# Patient Record
(demographics unavailable — no encounter records)

---

## 2025-02-09 NOTE — HISTORY OF PRESENT ILLNESS
[Patient] : patient [FreeTextEntry1] : unsteady gait, hx of Left breast cancer [FreeTextEntry2] : PMH chronic low back pain, former smoker, CAD s/p CABG x5 2017 and PCI 2021,HTN, DM, GI bleed with melena & BRBPR at different times, fall with head trauma 3/14/2022 admitted to [facility]; HER2+ Left breast cancer in December 2021 w/ mets to L axilla s/p chemo (last 8/2022), s/p left modified radical mastectomy with left breast oncoplastic flap closure with ATT on 1/13/23.   Interval events:  -A&Ox3 reports he is doing well -Went to [facility] for [place] -Saw Oncologist, all stable. Immunotherapy finished 1/16/24 for breast cancer- was started on Tamoxifen 20mg daily for 1 yr.  - Has appt for port removal tomorrow  Subjective: 1. Appetite/Weight: Good, no concerns 2. Gait/Falls: no recent falls, walks independently 3. Sleep: Good but wakes1-2 to urinate. 4. BMs: Some constipation.  5. Urine: Nocturia 6. Skin: No rash or ulcers. Healed scars on Left chest and abd s/p left breast surgery. 7. Mood/Memory: Pleasant  Breast cancer in male: -Pt doing well s/p left modified radical mastectomy with left breast oncoplastic flap     closure with ATT on 1/13/23 -Pt states immunotherapy finished 1/16/24 for breast cancer- was started on Tamoxifen 20mg daily for 1 yr.  - continues to follow with plastic Surgeon and oncology  CAD     - rosuvastatin 40 mg daily     - Toprol  mg daily     - Losartan 25mg daily     -Follow up with Cardiology  DM2: Chronic, controlled     -Checks glucose daily, FS ranging  for the last 2-3yrs     -Cholesterol & A1C followed by Oncologist     -Continue Jardiance 10mg daily and rosuvastatin 40mg daily  HTN: controlled    -continue metoprolol ER 100mg daily and losartan Potassium 25mg Daily    -continue to f/u with Cardiology    DME: Uses cane occasionally to ambulate. Has walker available.  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 2019 -Goal to return to [place] to see his family.

## 2025-02-09 NOTE — ASSESSMENT
[FreeTextEntry1] : Trial of miralax Due to insurance will be transitioning to new PCP. referred to [facility] for furhter assistance.

## 2025-02-09 NOTE — HEALTH RISK ASSESSMENT
[HRA Reviewed] : Health risk assessment reviewed [Independent] : managing finances [Some assistance needed] : using transportation [Full assistance needed] : doing laundry [No falls in past year] : Patient reported no falls in the past year [Yes] : The patient does have visual impairment [TimeGetUpGo] : 22

## 2025-02-09 NOTE — PHYSICAL EXAM
[No Acute Distress] : no acute distress [Normal Sclera/Conjunctiva] : normal sclera/conjunctiva [EOMI] : extra ocular movement intact [Normal Outer Ear/Nose] : the ears and nose were normal in appearance [Normal Oropharynx] : the oropharynx was normal [No Respiratory Distress] : no respiratory distress [Clear to Auscultation] : lungs were clear to auscultation bilaterally [No Accessory Muscle Use] : no accessory muscle use [Normal Rate] : heart rate was normal  [Regular Rhythm] : with a regular rhythm [Normal S1, S2] : normal S1 and S2 [No Murmurs] : no murmurs heard [No Edema] : there was no peripheral edema [Normal Bowel Sounds] : normal bowel sounds [Non Tender] : non-tender [Soft] : abdomen soft [Not Distended] : not distended [Normal Post Cervical Nodes] : no posterior cervical lymphadenopathy [Normal Anterior Cervical Nodes] : no anterior cervical lymphadenopathy [No CVA Tenderness] : no ~M costovertebral angle tenderness [Normal Gait] : normal gait [No Spinal Tenderness] : no spinal tenderness [Normal Strength/Tone] : muscle strength and tone were normal [No Rash] : no rash [No Motor Deficits] : the motor exam was normal [Oriented x3] : oriented to person, place, and time [Normal Mood] : the mood was normal [Normal Affect] : the affect was normal [Normal Insight/Judgement] : insight and judgment were intact [de-identified] : sternotomy scar [de-identified] : s/p L breast mastectomy, site healed

## 2025-02-09 NOTE — COUNSELING
[Overweight - ( BMI 25.1 - 29.9 )] : overweight -  ( BMI 25.1 - 29.9 ) [Non - Smoker] : non-smoker [Smoke/CO Detectors] : smoke/CO detectors [Decrease hospital use] : decrease hospital use [Minimize unnecessary interventions] : minimize unnecessary interventions [Patient/Caregiver has ___ understanding of disease process] : patient/caregiver has [unfilled] understanding of disease process [Completed Medical Orders for Life-Sustaining Treatment] : completed medical orders for life-sustaining treatment [DNR] : Code Status: DNR [Limited] : Treatment Guidelines: Limited [DNI] : Intubation: DNI [[facility]: _____[facility]: [unfilled] [_____] : HCP: [unfilled]

## 2025-02-09 NOTE — REASON FOR VISIT
[Follow-Up] : a follow-up visit [Pre-Visit Preparation] : pre-visit preparation was done [Intercurrent Specialty/Sub-specialty Visits] : the patient has intercurrent specialty/sub-specialty visits [FreeTextEntry2] : chart review [FreeTextEntry3] :  [facility] oncology & cardiology.

## 2025-04-04 NOTE — REASON FOR VISIT
[FreeTextEntry3] : Dr. Pena [FreeTextEntry1] : ================================================================= KATRINA ANTOINE is a 67 year old man with a history of CAD s/p CABG now with HER2 + left breast cancer here for follow up.  Prior Cancer Treatments: ------------------------------------------------------------------------ Chemo/targeted therapy: 3/2 -8/10/2022: TCHP x6 10/11/2022-1/16/2024: maintenance  1/2024: tamoxifen ------------------------------------------------------------------------ Surgery: 1/13/2023:  left modified radical mastectomy with left breast oncoplastic flap closure with ATT

## 2025-04-04 NOTE — REVIEW OF SYSTEMS
[SOB] : no shortness of breath [Dyspnea on exertion] : not dyspnea during exertion [Lower Ext Edema] : no extremity edema [Palpitations] : no palpitations [Orthopnea] : no orthopnea [Cough] : no cough [Abdominal Pain] : no abdominal pain [Change in Appetite] : no change in appetite [Blood in stool] : no blood in stoo [Depression] : no depression [Under Stress] : not under stress [Easy Bruising] : no tendency for easy bruising

## 2025-04-04 NOTE — HISTORY OF PRESENT ILLNESS
[FreeTextEntry1] : Interval History: He feels well. He reports no chest pain or changes in exercise tolerance. Remains on tamoxifen for breast cancer. No changes in medications. He is not currently taking aspirin.    History: KATRINA ANTOINE is a former smoker with a history of CAD s/p bypass grafting 3/8/2017 and PCI , HTN, pre-DM, diagnosed with HER2+ left breast cancer in 2021.  In , he was evaluated for DIAS and had positive stress test leading to CABG ([place]). Subsequently, a PCI was performed at [facility] after positive follow up stress test.  Breast Cancer treated initially at [facility]. He transferred care to [facility] to complete adjuvant HER2 targeted therapy. Baseline LVEF (by MUGA) was 68 %. He was admitted to [facility] 3/2022 for duodenal ulcer, transfused 1 unit PRBCs. This prompted discontinuation of clopidogrel. Given extensive CAD, we resumed aspirin 81 mg daily and Hgb remains stable.  Had mastectomy in 2023 uneventful tatiana-operative course.  Exercises by cycling a mile or two a few times per week.  2022: Tolerating Herceptin/Perjeta, but with plans to proceed to mastectomy. Cardiac clearance is requested. Continues to have dyspnea with exertion - stable. Occurs at about 15 minutes on the treadmill. No chest pain. Some diarrhea. Adherent to his medications.   3/7/2023: Feels very well. Had mastectomy in January and is recovering. Resumed HP for adjuvant treatment (instead of ado-trastuzumab). No chest pain. Exercise tolerance is stable. He is taking his medications.  2023: Feels very good. Lost weight - which he feels is due to reduced carbohydrates and healthier diet. He returned from holiday in [place] and resumed HER2 therapy. Is due for follow up echo Reports adherence to medications. Denies chest pain or new symptoms.  2023: Feeling well. Denies chest pain, palpitations, shortness of breath, or change in ET. He is taking aspirin (not clopidogrel). Remains on HP, last echo from July normal EF.  3/11/2024: Completed a year of adjuvant trastuzumab/pertuzumab in 2024 and started tamoxifen. Had colonoscopy with polypectomy. He feels well. He stopped aspirin for the polypectomy, but never resumed it. He is otherwise taking medications as directed and reports no issues. He has no change in exercise tolerance, shortness of breath, edema, or angina. He notes occasional pinching, shock like sensations in the right breast since starting tamoxifen, which occurs at rest and at random. Post-treatment echo 2024 - normal LV function and stable aortic root. Slight decreased in absolute value of GLS wnl.  10/4/2024: He feels well. He had follow up echo last week which showed normal LVEF and otherwise stable findings. He reports taking medications prescribed and reports no chest pain, shortness of breath, edema, orthopnea. He was restarted on clopidogrel by his PCPs office at some point. He has not noticed any bleeding. Travelling to [place] in December for holidays.   Cardiovascular Summary: ---------------------------------------------- EC2025: NSR 76 bpm, LAD, RBBB, no changes 10/4/2024: NSR 94 bpm, LAD, RBBB, no change from prior 3/11/2024: NSR 83 bpm, LAD, RBBB, no change from prior. 2023: NSR 67 bpm, RBBB 2023: NSR 66 bpm, possible LAE, RBBB 3/7/2023: sinus bradycardia, incomplete RBBB, non-specific TW abnormality 2022 NSR 63 bpm, incomplete RBBB 10/3/2022: sinus bradycardia 56 bpm, cannot rule out old posterior infarct. non-specific TW abnormality ---------------------------------------------- Echo: 2024: EF 60-65 %, GLS -17.7, aorta 4.1 3/19/2024: EF 62 %, GLS 16.4 %, aorta 4.1 cm, mild MR 10/10/2023: EF 58 %, GLS - 21.5, aortic root 4.1 cm, mild MR 2023: EF 65 %, mild MR, GLS -17.3 2023: EF 60-65 %, ascending aorta 4.1 cm, trace AI 10/4/2022: EF 64 %, trace AI 2022: EF 54 % 2022: EF 59 % 3/1/2022-Baseline MUGA = 68% ----------------------------------------------

## 2025-04-04 NOTE — ASSESSMENT
[FreeTextEntry1] : ================================================================= History of GI bleed prompted discontinuation of clopidogrel previously. Given extensive CAD, we resumed aspirin 81 mg daily and Hgb stable. Asymptomatic from cardiovascular standpoint. Because of slight decrease in absolute value of GLS on post-treatment echo, a repeat echo was performed in 9/2024.  # Coronary artery disease s/p bypass grafting without angina.  - rosuvastatin 40 mg daily - resume aspirin 81 mg daily (Rx sent) - Toprol  mg daily   # Risk for cardiomyopathy: increased risk of treatment related cardiac dysfunction because of CAD, pre-DM, HTN. - pro-BNP, HsTnT - both wnl.  - Last echo 9/2024: normal LV function and GLS, aortic root 4.1 cm (stable) - continue rosuvastatin 40 mg daily - last lipid panel 3/2023: LDL 85, HDL 38, total 165 - resumed ezetimibe given LDL > 70, no repeat since - continue Toprol XL - continue losartan 25 mg daily for now as BP is normal. (low BP recorded over the summer noted)  - repeat lipid panel and A1c today  # type 2 diabetes: controlled, A1c 5.8 % - cont empagliflozin 10 mg daily - metformin 500 ER once daily - last A1c March 2023  Repeat lipid panel and A1c today.  Follow up in 6 months with me.  Above discussed with  Ed and all questions were answered to the best of my ability and to his apparent satisfaction.

## 2025-04-04 NOTE — HISTORY OF PRESENT ILLNESS
[FreeTextEntry1] : Interval History: He feels well. He reports no chest pain or changes in exercise tolerance. Remains on tamoxifen for breast cancer. No changes in medications. He is not currently taking aspirin.    History: KATRINA ANTOINE is a former smoker with a history of CAD s/p bypass grafting 3/8/2017 and PCI , HTN, pre-DM, diagnosed with HER2+ left breast cancer in 2021.  In , he was evaluated for DIAS and had positive stress test leading to CABG ([place]). Subsequently, a PCI was performed at [facility] after positive follow up stress test.  Breast Cancer treated initially at [facility]. He transferred care to [facility] to complete adjuvant HER2 targeted therapy. Baseline LVEF (by MUGA) was 68 %. He was admitted to [facility] 3/2022 for duodenal ulcer, transfused 1 unit PRBCs. This prompted discontinuation of clopidogrel. Given extensive CAD, we resumed aspirin 81 mg daily and Hgb remains stable.  Had mastectomy in 2023 uneventful tatiana-operative course.  Exercises by cycling a mile or two a few times per week.  2022: Tolerating Herceptin/Perjeta, but with plans to proceed to mastectomy. Cardiac clearance is requested. Continues to have dyspnea with exertion - stable. Occurs at about 15 minutes on the treadmill. No chest pain. Some diarrhea. Adherent to his medications.   3/7/2023: Feels very well. Had mastectomy in January and is recovering. Resumed HP for adjuvant treatment (instead of ado-trastuzumab). No chest pain. Exercise tolerance is stable. He is taking his medications.  2023: Feels very good. Lost weight - which he feels is due to reduced carbohydrates and healthier diet. He returned from holiday in [place] and resumed HER2 therapy. Is due for follow up echo Reports adherence to medications. Denies chest pain or new symptoms.  2023: Feeling well. Denies chest pain, palpitations, shortness of breath, or change in ET. He is taking aspirin (not clopidogrel). Remains on HP, last echo from July normal EF.  3/11/2024: Completed a year of adjuvant trastuzumab/pertuzumab in 2024 and started tamoxifen. Had colonoscopy with polypectomy. He feels well. He stopped aspirin for the polypectomy, but never resumed it. He is otherwise taking medications as directed and reports no issues. He has no change in exercise tolerance, shortness of breath, edema, or angina. He notes occasional pinching, shock like sensations in the right breast since starting tamoxifen, which occurs at rest and at random. Post-treatment echo 2024 - normal LV function and stable aortic root. Slight decreased in absolute value of GLS wnl.  10/4/2024: He feels well. He had follow up echo last week which showed normal LVEF and otherwise stable findings. He reports taking medications prescribed and reports no chest pain, shortness of breath, edema, orthopnea. He was restarted on clopidogrel by his PCPs office at some point. He has not noticed any bleeding. Travelling to [place] in December for holidays.   Cardiovascular Summary: ---------------------------------------------- EC2025: NSR 76 bpm, LAD, RBBB, no changes 10/4/2024: NSR 94 bpm, LAD, RBBB, no change from prior 3/11/2024: NSR 83 bpm, LAD, RBBB, no change from prior. 2023: NSR 67 bpm, RBBB 2023: NSR 66 bpm, possible LAE, RBBB 3/7/2023: sinus bradycardia, incomplete RBBB, non-specific TW abnormality 2022 NSR 63 bpm, incomplete RBBB 10/3/2022: sinus bradycardia 56 bpm, cannot rule out old posterior infarct. non-specific TW abnormality ---------------------------------------------- Echo: 2024: EF 60-65 %, GLS -17.7, aorta 4.1 3/19/2024: EF 62 %, GLS 16.4 %, aorta 4.1 cm, mild MR 10/10/2023: EF 58 %, GLS - 21.5, aortic root 4.1 cm, mild MR 2023: EF 65 %, mild MR, GLS -17.3 2023: EF 60-65 %, ascending aorta 4.1 cm, trace AI 10/4/2022: EF 64 %, trace AI 2022: EF 54 % 2022: EF 59 % 3/1/2022-Baseline MUGA = 68% ----------------------------------------------